# Patient Record
Sex: MALE | URBAN - METROPOLITAN AREA
[De-identification: names, ages, dates, MRNs, and addresses within clinical notes are randomized per-mention and may not be internally consistent; named-entity substitution may affect disease eponyms.]

---

## 2019-01-21 ENCOUNTER — APPOINTMENT (RX ONLY)
Dept: URBAN - METROPOLITAN AREA CLINIC 184 | Facility: CLINIC | Age: 84
Setting detail: DERMATOLOGY
End: 2019-01-21

## 2019-01-21 DIAGNOSIS — L82.1 OTHER SEBORRHEIC KERATOSIS: ICD-10-CM

## 2019-01-21 DIAGNOSIS — L57.8 OTHER SKIN CHANGES DUE TO CHRONIC EXPOSURE TO NONIONIZING RADIATION: ICD-10-CM

## 2019-01-21 DIAGNOSIS — L57.0 ACTINIC KERATOSIS: ICD-10-CM

## 2019-01-21 DIAGNOSIS — D22 MELANOCYTIC NEVI: ICD-10-CM

## 2019-01-21 DIAGNOSIS — L81.4 OTHER MELANIN HYPERPIGMENTATION: ICD-10-CM

## 2019-01-21 PROBLEM — D48.5 NEOPLASM OF UNCERTAIN BEHAVIOR OF SKIN: Status: ACTIVE | Noted: 2019-01-21

## 2019-01-21 PROBLEM — Z85.828 PERSONAL HISTORY OF OTHER MALIGNANT NEOPLASM OF SKIN: Status: ACTIVE | Noted: 2019-01-21

## 2019-01-21 PROBLEM — L70.0 ACNE VULGARIS: Status: ACTIVE | Noted: 2019-01-21

## 2019-01-21 PROBLEM — D22.9 MELANOCYTIC NEVI, UNSPECIFIED: Status: ACTIVE | Noted: 2019-01-21

## 2019-01-21 PROCEDURE — ? SUNSCREEN RECOMMENDATIONS

## 2019-01-21 PROCEDURE — ? COUNSELING

## 2019-01-21 PROCEDURE — ? LIQUID NITROGEN

## 2019-01-21 PROCEDURE — 99213 OFFICE O/P EST LOW 20 MIN: CPT | Mod: 25

## 2019-01-21 PROCEDURE — 17003 DESTRUCT PREMALG LES 2-14: CPT

## 2019-01-21 PROCEDURE — 17000 DESTRUCT PREMALG LESION: CPT

## 2019-01-21 ASSESSMENT — LOCATION SIMPLE DESCRIPTION DERM
LOCATION SIMPLE: SCALP
LOCATION SIMPLE: LEFT SCALP

## 2019-01-21 ASSESSMENT — LOCATION DETAILED DESCRIPTION DERM
LOCATION DETAILED: LEFT SUPERIOR PARIETAL SCALP
LOCATION DETAILED: LEFT CENTRAL FRONTAL SCALP

## 2019-01-21 ASSESSMENT — LOCATION ZONE DERM: LOCATION ZONE: SCALP

## 2019-01-21 NOTE — PROCEDURE: LIQUID NITROGEN
Number Of Freeze-Thaw Cycles: 1 freeze-thaw cycle
Post-Care Instructions: I reviewed with the patient in detail post-care instructions. Patient is to wear sunprotection, and avoid picking at any of the treated lesions. Pt may apply Vaseline to crusted or scabbing areas.
Detail Level: Zone
Render Post-Care Instructions In Note?: no
Duration Of Freeze Thaw-Cycle (Seconds): 10
Consent: The patient's consent was obtained including but not limited to risks of crusting, scabbing, blistering, scarring, darker or lighter pigmentary change, recurrence, incomplete removal and infection.

## 2019-07-29 ENCOUNTER — APPOINTMENT (RX ONLY)
Dept: URBAN - METROPOLITAN AREA CLINIC 184 | Facility: CLINIC | Age: 84
Setting detail: DERMATOLOGY
End: 2019-07-29

## 2019-07-29 ENCOUNTER — RX ONLY (OUTPATIENT)
Age: 84
Setting detail: RX ONLY
End: 2019-07-29

## 2019-07-29 DIAGNOSIS — D22 MELANOCYTIC NEVI: ICD-10-CM

## 2019-07-29 DIAGNOSIS — L82.1 OTHER SEBORRHEIC KERATOSIS: ICD-10-CM

## 2019-07-29 DIAGNOSIS — Z85.828 PERSONAL HISTORY OF OTHER MALIGNANT NEOPLASM OF SKIN: ICD-10-CM

## 2019-07-29 DIAGNOSIS — L72.3 SEBACEOUS CYST: ICD-10-CM

## 2019-07-29 DIAGNOSIS — L57.8 OTHER SKIN CHANGES DUE TO CHRONIC EXPOSURE TO NONIONIZING RADIATION: ICD-10-CM

## 2019-07-29 DIAGNOSIS — L57.0 ACTINIC KERATOSIS: ICD-10-CM

## 2019-07-29 DIAGNOSIS — R20.2 PARESTHESIA OF SKIN: ICD-10-CM

## 2019-07-29 DIAGNOSIS — L81.4 OTHER MELANIN HYPERPIGMENTATION: ICD-10-CM

## 2019-07-29 PROBLEM — D22.9 MELANOCYTIC NEVI, UNSPECIFIED: Status: ACTIVE | Noted: 2019-07-29

## 2019-07-29 PROCEDURE — 17003 DESTRUCT PREMALG LES 2-14: CPT

## 2019-07-29 PROCEDURE — 17000 DESTRUCT PREMALG LESION: CPT

## 2019-07-29 PROCEDURE — ? SUNSCREEN RECOMMENDATIONS

## 2019-07-29 PROCEDURE — ? INVENTORY

## 2019-07-29 PROCEDURE — ? COUNSELING

## 2019-07-29 PROCEDURE — ? LIQUID NITROGEN

## 2019-07-29 PROCEDURE — 99214 OFFICE O/P EST MOD 30 MIN: CPT | Mod: 25

## 2019-07-29 RX ORDER — KETOCONAZOLE 20.5 MG/ML
SHAMPOO, SUSPENSION TOPICAL
Qty: 1 | Refills: 3 | COMMUNITY
Start: 2019-07-29

## 2019-07-29 ASSESSMENT — LOCATION SIMPLE DESCRIPTION DERM
LOCATION SIMPLE: SCALP
LOCATION SIMPLE: LEFT FOREHEAD
LOCATION SIMPLE: LEFT PRETIBIAL REGION
LOCATION SIMPLE: RIGHT UPPER BACK

## 2019-07-29 ASSESSMENT — LOCATION DETAILED DESCRIPTION DERM
LOCATION DETAILED: LEFT SUPERIOR FOREHEAD
LOCATION DETAILED: RIGHT MID-UPPER BACK
LOCATION DETAILED: LEFT DISTAL PRETIBIAL REGION
LOCATION DETAILED: RIGHT SUPERIOR PARIETAL SCALP
LOCATION DETAILED: RIGHT MEDIAL UPPER BACK

## 2019-07-29 ASSESSMENT — LOCATION ZONE DERM
LOCATION ZONE: FACE
LOCATION ZONE: SCALP
LOCATION ZONE: LEG
LOCATION ZONE: TRUNK

## 2020-01-20 ENCOUNTER — APPOINTMENT (RX ONLY)
Dept: URBAN - METROPOLITAN AREA CLINIC 184 | Facility: CLINIC | Age: 85
Setting detail: DERMATOLOGY
End: 2020-01-20

## 2020-01-20 DIAGNOSIS — Z85.828 PERSONAL HISTORY OF OTHER MALIGNANT NEOPLASM OF SKIN: ICD-10-CM

## 2020-01-20 DIAGNOSIS — L82.1 OTHER SEBORRHEIC KERATOSIS: ICD-10-CM

## 2020-01-20 DIAGNOSIS — L57.8 OTHER SKIN CHANGES DUE TO CHRONIC EXPOSURE TO NONIONIZING RADIATION: ICD-10-CM

## 2020-01-20 DIAGNOSIS — D22 MELANOCYTIC NEVI: ICD-10-CM

## 2020-01-20 DIAGNOSIS — L81.4 OTHER MELANIN HYPERPIGMENTATION: ICD-10-CM

## 2020-01-20 DIAGNOSIS — L57.0 ACTINIC KERATOSIS: ICD-10-CM

## 2020-01-20 PROBLEM — D22.9 MELANOCYTIC NEVI, UNSPECIFIED: Status: ACTIVE | Noted: 2020-01-20

## 2020-01-20 PROCEDURE — ? SUNSCREEN RECOMMENDATIONS

## 2020-01-20 PROCEDURE — 99213 OFFICE O/P EST LOW 20 MIN: CPT | Mod: 25

## 2020-01-20 PROCEDURE — ? LIQUID NITROGEN

## 2020-01-20 PROCEDURE — 17000 DESTRUCT PREMALG LESION: CPT

## 2020-01-20 PROCEDURE — ? COUNSELING

## 2020-01-20 PROCEDURE — 17003 DESTRUCT PREMALG LES 2-14: CPT

## 2020-01-20 ASSESSMENT — LOCATION DETAILED DESCRIPTION DERM
LOCATION DETAILED: LEFT SUPERIOR PARIETAL SCALP
LOCATION DETAILED: LEFT NASAL DORSUM

## 2020-01-20 ASSESSMENT — LOCATION SIMPLE DESCRIPTION DERM
LOCATION SIMPLE: NOSE
LOCATION SIMPLE: SCALP

## 2020-01-20 ASSESSMENT — LOCATION ZONE DERM
LOCATION ZONE: SCALP
LOCATION ZONE: NOSE

## 2020-01-20 NOTE — PROCEDURE: LIQUID NITROGEN
Detail Level: Simple
Render Note In Bullet Format When Appropriate: No
Duration Of Freeze Thaw-Cycle (Seconds): 10
Post-Care Instructions: I reviewed with the patient in detail post-care instructions. Patient is to wear sunprotection, and avoid picking at any of the treated lesions. Pt may apply Vaseline to crusted or scabbing areas.
Number Of Freeze-Thaw Cycles: 1 freeze-thaw cycle
Consent: The patient's consent was obtained including but not limited to risks of crusting, scabbing, blistering, scarring, darker or lighter pigmentary change, recurrence, incomplete removal and infection.

## 2020-07-20 ENCOUNTER — APPOINTMENT (RX ONLY)
Dept: URBAN - METROPOLITAN AREA CLINIC 184 | Facility: CLINIC | Age: 85
Setting detail: DERMATOLOGY
End: 2020-07-20

## 2020-07-20 DIAGNOSIS — L72.3 SEBACEOUS CYST: ICD-10-CM

## 2020-07-20 DIAGNOSIS — L82.0 INFLAMED SEBORRHEIC KERATOSIS: ICD-10-CM

## 2020-07-20 DIAGNOSIS — L81.4 OTHER MELANIN HYPERPIGMENTATION: ICD-10-CM

## 2020-07-20 DIAGNOSIS — L82.1 OTHER SEBORRHEIC KERATOSIS: ICD-10-CM

## 2020-07-20 DIAGNOSIS — Z85.828 PERSONAL HISTORY OF OTHER MALIGNANT NEOPLASM OF SKIN: ICD-10-CM

## 2020-07-20 DIAGNOSIS — L57.8 OTHER SKIN CHANGES DUE TO CHRONIC EXPOSURE TO NONIONIZING RADIATION: ICD-10-CM

## 2020-07-20 DIAGNOSIS — D22 MELANOCYTIC NEVI: ICD-10-CM

## 2020-07-20 DIAGNOSIS — L57.0 ACTINIC KERATOSIS: ICD-10-CM

## 2020-07-20 PROBLEM — D22.9 MELANOCYTIC NEVI, UNSPECIFIED: Status: ACTIVE | Noted: 2020-07-20

## 2020-07-20 PROCEDURE — ? BENIGN DESTRUCTION

## 2020-07-20 PROCEDURE — ? SUNSCREEN RECOMMENDATIONS

## 2020-07-20 PROCEDURE — 17000 DESTRUCT PREMALG LESION: CPT | Mod: 59

## 2020-07-20 PROCEDURE — ? INCISION AND DRAINAGE

## 2020-07-20 PROCEDURE — 99214 OFFICE O/P EST MOD 30 MIN: CPT | Mod: 25

## 2020-07-20 PROCEDURE — 17003 DESTRUCT PREMALG LES 2-14: CPT | Mod: 59

## 2020-07-20 PROCEDURE — ? LIQUID NITROGEN

## 2020-07-20 PROCEDURE — ? COUNSELING

## 2020-07-20 PROCEDURE — 17110 DESTRUCTION B9 LES UP TO 14: CPT

## 2020-07-20 PROCEDURE — 10060 I&D ABSCESS SIMPLE/SINGLE: CPT

## 2020-07-20 ASSESSMENT — LOCATION DETAILED DESCRIPTION DERM
LOCATION DETAILED: POSTERIOR MID-PARIETAL SCALP
LOCATION DETAILED: MID-OCCIPITAL SCALP
LOCATION DETAILED: RIGHT SUPERIOR PARIETAL SCALP
LOCATION DETAILED: RIGHT MEDIAL FOREHEAD
LOCATION DETAILED: RIGHT SUPERIOR CENTRAL MALAR CHEEK

## 2020-07-20 ASSESSMENT — LOCATION SIMPLE DESCRIPTION DERM
LOCATION SIMPLE: POSTERIOR SCALP
LOCATION SIMPLE: RIGHT FOREHEAD
LOCATION SIMPLE: SCALP
LOCATION SIMPLE: RIGHT CHEEK

## 2020-07-20 ASSESSMENT — LOCATION ZONE DERM
LOCATION ZONE: FACE
LOCATION ZONE: SCALP

## 2020-07-20 NOTE — PROCEDURE: BENIGN DESTRUCTION
Consent: The patient's consent was obtained including but not limited to risks of crusting, scabbing, blistering, scarring, darker or lighter pigmentary change, recurrence, incomplete removal and infection.
Anesthesia Volume In Cc: 0.5
Medical Necessity Information: It is in your best interest to select a reason for this procedure from the list below. All of these items fulfill various CMS LCD requirements except the new and changing color options.
Treatment Number (Will Not Render If 0): 0
Post-Care Instructions: I reviewed with the patient in detail post-care instructions. Patient is to wear sunprotection, and avoid picking at any of the treated lesions. Pt may apply Vaseline to crusted or scabbing areas.
Detail Level: Detailed
Render Post-Care Instructions In Note?: no
Medical Necessity Clause: This procedure was medically necessary because the lesions that were treated were:

## 2020-07-20 NOTE — PROCEDURE: INCISION AND DRAINAGE
Detail Level: Detailed
Lesion Type: Cyst
Method: comedo extractor
Curette: No
Anesthesia Type: 1% lidocaine with epinephrine
Size Of Lesion In Cm (Optional But May Be Required For Some Insurances): 0
Wound Care: Polysporin ointment
Dressing: dry sterile dressing
Epidermal Sutures: 4-0 Ethilon
Epidermal Closure: simple interrupted
Suture Text: The incision was partially closed with
Preparation Text: The area was prepped in the usual clean fashion.
Curette Text (Optional): After the contents were expressed a curette was used to partially remove the cyst wall.
Consent was obtained and risks were reviewed including but not limited to delayed wound healing, infection, need for multiple I and D's, and pain.
Post-Care Instructions: I reviewed with the patient in detail post-care instructions. Patient should keep wound covered and call the office should any redness, pain, swelling or worsening occur.

## 2020-07-20 NOTE — PROCEDURE: LIQUID NITROGEN
Number Of Freeze-Thaw Cycles: 1 freeze-thaw cycle
Post-Care Instructions: I reviewed with the patient in detail post-care instructions. Patient is to wear sunprotection, and avoid picking at any of the treated lesions. Pt may apply Vaseline to crusted or scabbing areas.
Detail Level: Simple
Render Note In Bullet Format When Appropriate: No
Duration Of Freeze Thaw-Cycle (Seconds): 10
Consent: The patient's consent was obtained including but not limited to risks of crusting, scabbing, blistering, scarring, darker or lighter pigmentary change, recurrence, incomplete removal and infection.

## 2021-01-25 ENCOUNTER — APPOINTMENT (RX ONLY)
Dept: URBAN - METROPOLITAN AREA CLINIC 184 | Facility: CLINIC | Age: 86
Setting detail: DERMATOLOGY
End: 2021-01-25

## 2021-01-25 DIAGNOSIS — L57.0 ACTINIC KERATOSIS: ICD-10-CM

## 2021-01-25 PROCEDURE — 17003 DESTRUCT PREMALG LES 2-14: CPT

## 2021-01-25 PROCEDURE — ? LIQUID NITROGEN

## 2021-01-25 PROCEDURE — 17000 DESTRUCT PREMALG LESION: CPT

## 2021-01-25 ASSESSMENT — LOCATION DETAILED DESCRIPTION DERM
LOCATION DETAILED: LEFT CENTRAL FRONTAL SCALP
LOCATION DETAILED: MID-OCCIPITAL SCALP
LOCATION DETAILED: RIGHT SUPERIOR PARIETAL SCALP

## 2021-01-25 ASSESSMENT — LOCATION SIMPLE DESCRIPTION DERM
LOCATION SIMPLE: SCALP
LOCATION SIMPLE: LEFT SCALP
LOCATION SIMPLE: POSTERIOR SCALP

## 2021-01-25 ASSESSMENT — LOCATION ZONE DERM: LOCATION ZONE: SCALP

## 2021-01-25 NOTE — PROCEDURE: LIQUID NITROGEN
Number Of Freeze-Thaw Cycles: 1 freeze-thaw cycle
Duration Of Freeze Thaw-Cycle (Seconds): 10
Render Note In Bullet Format When Appropriate: No
Detail Level: Zone
Post-Care Instructions: I reviewed with the patient in detail post-care instructions. Patient is to wear sunprotection, and avoid picking at any of the treated lesions. Pt may apply Vaseline to crusted or scabbing areas.
Consent: The patient's consent was obtained including but not limited to risks of crusting, scabbing, blistering, scarring, darker or lighter pigmentary change, recurrence, incomplete removal and infection.

## 2021-05-03 ENCOUNTER — APPOINTMENT (RX ONLY)
Dept: URBAN - METROPOLITAN AREA CLINIC 184 | Facility: CLINIC | Age: 86
Setting detail: DERMATOLOGY
End: 2021-05-03

## 2021-05-03 DIAGNOSIS — L82.1 OTHER SEBORRHEIC KERATOSIS: ICD-10-CM | Status: STABLE

## 2021-05-03 DIAGNOSIS — L57.0 ACTINIC KERATOSIS: ICD-10-CM | Status: INADEQUATELY CONTROLLED

## 2021-05-03 PROCEDURE — 17003 DESTRUCT PREMALG LES 2-14: CPT

## 2021-05-03 PROCEDURE — 99212 OFFICE O/P EST SF 10 MIN: CPT | Mod: 25

## 2021-05-03 PROCEDURE — ? COUNSELING

## 2021-05-03 PROCEDURE — 17000 DESTRUCT PREMALG LESION: CPT

## 2021-05-03 PROCEDURE — ? LIQUID NITROGEN

## 2021-05-03 ASSESSMENT — LOCATION DETAILED DESCRIPTION DERM
LOCATION DETAILED: RIGHT SUPERIOR FOREHEAD
LOCATION DETAILED: LEFT SUPERIOR PARIETAL SCALP
LOCATION DETAILED: RIGHT ANTERIOR PROXIMAL THIGH
LOCATION DETAILED: LEFT SUPERIOR OCCIPITAL SCALP
LOCATION DETAILED: RIGHT SUPERIOR OCCIPITAL SCALP

## 2021-05-03 ASSESSMENT — LOCATION SIMPLE DESCRIPTION DERM
LOCATION SIMPLE: LEFT OCCIPITAL SCALP
LOCATION SIMPLE: SCALP
LOCATION SIMPLE: RIGHT OCCIPITAL SCALP
LOCATION SIMPLE: RIGHT THIGH
LOCATION SIMPLE: RIGHT FOREHEAD

## 2021-05-03 ASSESSMENT — LOCATION ZONE DERM
LOCATION ZONE: SCALP
LOCATION ZONE: FACE
LOCATION ZONE: LEG

## 2021-05-03 NOTE — PROCEDURE: COUNSELING
Detail Level: Simple
Detail Level: Detailed
Patient Specific Counseling (Will Not Stick From Patient To Patient): Wear hat when out

## 2021-07-27 ENCOUNTER — APPOINTMENT (RX ONLY)
Dept: URBAN - METROPOLITAN AREA CLINIC 184 | Facility: CLINIC | Age: 86
Setting detail: DERMATOLOGY
End: 2021-07-27

## 2021-07-27 DIAGNOSIS — L57.8 OTHER SKIN CHANGES DUE TO CHRONIC EXPOSURE TO NONIONIZING RADIATION: ICD-10-CM

## 2021-07-27 DIAGNOSIS — L81.4 OTHER MELANIN HYPERPIGMENTATION: ICD-10-CM

## 2021-07-27 DIAGNOSIS — L82.1 OTHER SEBORRHEIC KERATOSIS: ICD-10-CM

## 2021-07-27 DIAGNOSIS — L57.0 ACTINIC KERATOSIS: ICD-10-CM

## 2021-07-27 DIAGNOSIS — D22 MELANOCYTIC NEVI: ICD-10-CM

## 2021-07-27 DIAGNOSIS — Z85.828 PERSONAL HISTORY OF OTHER MALIGNANT NEOPLASM OF SKIN: ICD-10-CM

## 2021-07-27 PROBLEM — D22.9 MELANOCYTIC NEVI, UNSPECIFIED: Status: ACTIVE | Noted: 2021-07-27

## 2021-07-27 PROCEDURE — 99213 OFFICE O/P EST LOW 20 MIN: CPT | Mod: 25

## 2021-07-27 PROCEDURE — ? TREATMENT REGIMEN

## 2021-07-27 PROCEDURE — ? LIQUID NITROGEN

## 2021-07-27 PROCEDURE — ? COUNSELING

## 2021-07-27 PROCEDURE — 17003 DESTRUCT PREMALG LES 2-14: CPT

## 2021-07-27 PROCEDURE — 17000 DESTRUCT PREMALG LESION: CPT

## 2021-07-27 PROCEDURE — ? FULL BODY SKIN EXAM

## 2021-07-27 PROCEDURE — ? SUNSCREEN RECOMMENDATIONS

## 2021-07-27 ASSESSMENT — LOCATION SIMPLE DESCRIPTION DERM
LOCATION SIMPLE: LEFT FOREHEAD
LOCATION SIMPLE: SCALP

## 2021-07-27 ASSESSMENT — LOCATION ZONE DERM
LOCATION ZONE: FACE
LOCATION ZONE: SCALP

## 2021-07-27 ASSESSMENT — LOCATION DETAILED DESCRIPTION DERM
LOCATION DETAILED: LEFT SUPERIOR PARIETAL SCALP
LOCATION DETAILED: LEFT SUPERIOR LATERAL FOREHEAD

## 2021-07-27 NOTE — PROCEDURE: LIQUID NITROGEN
Render Note In Bullet Format When Appropriate: No
Number Of Freeze-Thaw Cycles: 1 freeze-thaw cycle
Duration Of Freeze Thaw-Cycle (Seconds): 10
Post-Care Instructions: I reviewed with the patient in detail post-care instructions. Patient is to wear sunprotection, and avoid picking at any of the treated lesions. Pt may apply Vaseline to crusted or scabbing areas.
Consent: The patient's consent was obtained including but not limited to risks of crusting, scabbing, blistering, scarring, darker or lighter pigmentary change, recurrence, incomplete removal and infection.
Show Aperture Variable?: Yes
Detail Level: Zone

## 2022-06-07 ENCOUNTER — APPOINTMENT (RX ONLY)
Dept: URBAN - METROPOLITAN AREA CLINIC 184 | Facility: CLINIC | Age: 87
Setting detail: DERMATOLOGY
End: 2022-06-07

## 2022-06-07 DIAGNOSIS — L81.4 OTHER MELANIN HYPERPIGMENTATION: ICD-10-CM

## 2022-06-07 DIAGNOSIS — D22 MELANOCYTIC NEVI: ICD-10-CM

## 2022-06-07 DIAGNOSIS — L57.0 ACTINIC KERATOSIS: ICD-10-CM

## 2022-06-07 DIAGNOSIS — L57.8 OTHER SKIN CHANGES DUE TO CHRONIC EXPOSURE TO NONIONIZING RADIATION: ICD-10-CM

## 2022-06-07 DIAGNOSIS — L82.1 OTHER SEBORRHEIC KERATOSIS: ICD-10-CM

## 2022-06-07 DIAGNOSIS — Z85.828 PERSONAL HISTORY OF OTHER MALIGNANT NEOPLASM OF SKIN: ICD-10-CM

## 2022-06-07 PROBLEM — D22.9 MELANOCYTIC NEVI, UNSPECIFIED: Status: ACTIVE | Noted: 2022-06-07

## 2022-06-07 PROCEDURE — ? TREATMENT REGIMEN

## 2022-06-07 PROCEDURE — 99213 OFFICE O/P EST LOW 20 MIN: CPT | Mod: 25

## 2022-06-07 PROCEDURE — ? COUNSELING

## 2022-06-07 PROCEDURE — ? LIQUID NITROGEN

## 2022-06-07 PROCEDURE — 17003 DESTRUCT PREMALG LES 2-14: CPT

## 2022-06-07 PROCEDURE — 17000 DESTRUCT PREMALG LESION: CPT

## 2022-06-07 PROCEDURE — ? SUNSCREEN RECOMMENDATIONS

## 2022-06-07 ASSESSMENT — LOCATION ZONE DERM
LOCATION ZONE: LEG
LOCATION ZONE: FACE
LOCATION ZONE: SCALP

## 2022-06-07 ASSESSMENT — LOCATION SIMPLE DESCRIPTION DERM
LOCATION SIMPLE: POSTERIOR SCALP
LOCATION SIMPLE: LEFT FOREHEAD
LOCATION SIMPLE: LEFT PRETIBIAL REGION

## 2022-06-07 ASSESSMENT — LOCATION DETAILED DESCRIPTION DERM
LOCATION DETAILED: LEFT DISTAL PRETIBIAL REGION
LOCATION DETAILED: LEFT SUPERIOR FOREHEAD
LOCATION DETAILED: POSTERIOR MID-PARIETAL SCALP

## 2022-06-07 NOTE — PROCEDURE: LIQUID NITROGEN
Render Note In Bullet Format When Appropriate: No
Duration Of Freeze Thaw-Cycle (Seconds): 10
Post-Care Instructions: I reviewed with the patient in detail post-care instructions. Patient is to wear sunprotection, and avoid picking at any of the treated lesions. Pt may apply Vaseline to crusted or scabbing areas.
Number Of Freeze-Thaw Cycles: 1 freeze-thaw cycle
Detail Level: Zone
Show Aperture Variable?: Yes
Consent: The patient's consent was obtained including but not limited to risks of crusting, scabbing, blistering, scarring, darker or lighter pigmentary change, recurrence, incomplete removal and infection.

## 2022-06-27 ENCOUNTER — APPOINTMENT (RX ONLY)
Dept: URBAN - METROPOLITAN AREA CLINIC 184 | Facility: CLINIC | Age: 87
Setting detail: DERMATOLOGY
End: 2022-06-27

## 2022-06-27 DIAGNOSIS — L82.0 INFLAMED SEBORRHEIC KERATOSIS: ICD-10-CM

## 2022-06-27 PROCEDURE — ? LIQUID NITROGEN

## 2022-06-27 PROCEDURE — 17110 DESTRUCTION B9 LES UP TO 14: CPT

## 2022-06-27 ASSESSMENT — LOCATION ZONE DERM: LOCATION ZONE: LEG

## 2022-06-27 ASSESSMENT — LOCATION SIMPLE DESCRIPTION DERM: LOCATION SIMPLE: LEFT THIGH

## 2022-06-27 ASSESSMENT — LOCATION DETAILED DESCRIPTION DERM: LOCATION DETAILED: LEFT ANTERIOR LATERAL DISTAL THIGH

## 2022-06-27 NOTE — PROCEDURE: LIQUID NITROGEN
Render Post-Care Instructions In Note?: no
Show Applicator Variable?: Yes
Detail Level: Simple
Medical Necessity Information: It is in your best interest to select a reason for this procedure from the list below. All of these items fulfill various CMS LCD requirements except the new and changing color options.
Consent: The patient's consent was obtained including but not limited to risks of crusting, scabbing, blistering, scarring, darker or lighter pigmentary change, recurrence, incomplete removal and infection.
Spray Paint Text: The liquid nitrogen was applied to the skin utilizing a spray paint frosting technique.
Medical Necessity Clause: This procedure was medically necessary because the lesions that were treated were:
Post-Care Instructions: I reviewed with the patient in detail post-care instructions. Patient is to wear sunprotection, and avoid picking at any of the treated lesions. Pt may apply Vaseline to crusted or scabbing areas.

## 2022-12-21 ENCOUNTER — APPOINTMENT (RX ONLY)
Dept: URBAN - METROPOLITAN AREA CLINIC 184 | Facility: CLINIC | Age: 87
Setting detail: DERMATOLOGY
End: 2022-12-21

## 2022-12-21 DIAGNOSIS — L82.1 OTHER SEBORRHEIC KERATOSIS: ICD-10-CM

## 2022-12-21 DIAGNOSIS — L57.8 OTHER SKIN CHANGES DUE TO CHRONIC EXPOSURE TO NONIONIZING RADIATION: ICD-10-CM

## 2022-12-21 DIAGNOSIS — L57.0 ACTINIC KERATOSIS: ICD-10-CM

## 2022-12-21 DIAGNOSIS — L81.4 OTHER MELANIN HYPERPIGMENTATION: ICD-10-CM

## 2022-12-21 DIAGNOSIS — Z85.828 PERSONAL HISTORY OF OTHER MALIGNANT NEOPLASM OF SKIN: ICD-10-CM

## 2022-12-21 DIAGNOSIS — D22 MELANOCYTIC NEVI: ICD-10-CM

## 2022-12-21 PROBLEM — D22.9 MELANOCYTIC NEVI, UNSPECIFIED: Status: ACTIVE | Noted: 2022-12-21

## 2022-12-21 PROCEDURE — ? SUNSCREEN RECOMMENDATIONS

## 2022-12-21 PROCEDURE — ? LIQUID NITROGEN

## 2022-12-21 PROCEDURE — ? COUNSELING

## 2022-12-21 PROCEDURE — ? TREATMENT REGIMEN

## 2022-12-21 PROCEDURE — 17003 DESTRUCT PREMALG LES 2-14: CPT

## 2022-12-21 PROCEDURE — 99213 OFFICE O/P EST LOW 20 MIN: CPT | Mod: 25

## 2022-12-21 PROCEDURE — 17000 DESTRUCT PREMALG LESION: CPT

## 2022-12-21 ASSESSMENT — LOCATION DETAILED DESCRIPTION DERM
LOCATION DETAILED: MID-OCCIPITAL SCALP
LOCATION DETAILED: LEFT DISTAL PRETIBIAL REGION
LOCATION DETAILED: LEFT CENTRAL FRONTAL SCALP
LOCATION DETAILED: LEFT DISTAL POSTERIOR UPPER ARM

## 2022-12-21 ASSESSMENT — LOCATION ZONE DERM
LOCATION ZONE: ARM
LOCATION ZONE: SCALP
LOCATION ZONE: LEG

## 2022-12-21 ASSESSMENT — LOCATION SIMPLE DESCRIPTION DERM
LOCATION SIMPLE: POSTERIOR SCALP
LOCATION SIMPLE: LEFT UPPER ARM
LOCATION SIMPLE: LEFT PRETIBIAL REGION
LOCATION SIMPLE: LEFT SCALP

## 2022-12-21 NOTE — PROCEDURE: LIQUID NITROGEN
Render Post-Care Instructions In Note?: no
Number Of Freeze-Thaw Cycles: 1 freeze-thaw cycle
Consent: The patient's consent was obtained including but not limited to risks of crusting, scabbing, blistering, scarring, darker or lighter pigmentary change, recurrence, incomplete removal and infection.
Detail Level: Zone
Show Aperture Variable?: Yes
Post-Care Instructions: I reviewed with the patient in detail post-care instructions. Patient is to wear sunprotection, and avoid picking at any of the treated lesions. Pt may apply Vaseline to crusted or scabbing areas.
Duration Of Freeze Thaw-Cycle (Seconds): 10

## 2023-06-21 ENCOUNTER — APPOINTMENT (RX ONLY)
Dept: URBAN - METROPOLITAN AREA CLINIC 184 | Facility: CLINIC | Age: 88
Setting detail: DERMATOLOGY
End: 2023-06-21

## 2023-06-21 DIAGNOSIS — L57.0 ACTINIC KERATOSIS: ICD-10-CM

## 2023-06-21 PROCEDURE — ? COUNSELING

## 2023-06-21 PROCEDURE — 17000 DESTRUCT PREMALG LESION: CPT

## 2023-06-21 PROCEDURE — ? LIQUID NITROGEN

## 2023-06-21 PROCEDURE — 17003 DESTRUCT PREMALG LES 2-14: CPT

## 2023-06-21 ASSESSMENT — LOCATION ZONE DERM
LOCATION ZONE: SCALP
LOCATION ZONE: LEG

## 2023-06-21 ASSESSMENT — LOCATION SIMPLE DESCRIPTION DERM
LOCATION SIMPLE: RIGHT PRETIBIAL REGION
LOCATION SIMPLE: SCALP

## 2023-06-21 ASSESSMENT — LOCATION DETAILED DESCRIPTION DERM
LOCATION DETAILED: RIGHT SUPERIOR PARIETAL SCALP
LOCATION DETAILED: RIGHT PROXIMAL PRETIBIAL REGION

## 2023-06-21 NOTE — PROCEDURE: LIQUID NITROGEN
Render Post-Care Instructions In Note?: no
Duration Of Freeze Thaw-Cycle (Seconds): 10
Show Aperture Variable?: Yes
Detail Level: Zone
Application Tool (Optional): Liquid Nitrogen Sprayer
Number Of Freeze-Thaw Cycles: 1 freeze-thaw cycle
Consent: The patient's consent was obtained including but not limited to risks of crusting, scabbing, blistering, scarring, darker or lighter pigmentary change, recurrence, incomplete removal and infection.
Post-Care Instructions: I reviewed with the patient in detail post-care instructions. Patient is to wear sunprotection, and avoid picking at any of the treated lesions. Pt may apply Vaseline to crusted or scabbing areas.

## 2023-07-17 ENCOUNTER — APPOINTMENT (RX ONLY)
Dept: URBAN - METROPOLITAN AREA CLINIC 184 | Facility: CLINIC | Age: 88
Setting detail: DERMATOLOGY
End: 2023-07-17

## 2023-07-17 DIAGNOSIS — L82.1 OTHER SEBORRHEIC KERATOSIS: ICD-10-CM

## 2023-07-17 DIAGNOSIS — L21.8 OTHER SEBORRHEIC DERMATITIS: ICD-10-CM

## 2023-07-17 DIAGNOSIS — L57.0 ACTINIC KERATOSIS: ICD-10-CM

## 2023-07-17 PROCEDURE — 99213 OFFICE O/P EST LOW 20 MIN: CPT | Mod: 25

## 2023-07-17 PROCEDURE — ? PRESCRIPTION

## 2023-07-17 PROCEDURE — ? LIQUID NITROGEN

## 2023-07-17 PROCEDURE — ? COUNSELING

## 2023-07-17 PROCEDURE — 17000 DESTRUCT PREMALG LESION: CPT

## 2023-07-17 PROCEDURE — 17003 DESTRUCT PREMALG LES 2-14: CPT

## 2023-07-17 RX ORDER — KETOCONAZOLE 20 MG/ML
SHAMPOO, SUSPENSION TOPICAL TIW
Qty: 120 | Refills: 5 | Status: ERX | COMMUNITY
Start: 2023-07-17

## 2023-07-17 RX ADMIN — KETOCONAZOLE: 20 SHAMPOO, SUSPENSION TOPICAL at 00:00

## 2023-07-17 ASSESSMENT — LOCATION DETAILED DESCRIPTION DERM
LOCATION DETAILED: LEFT SUPERIOR FOREHEAD
LOCATION DETAILED: RIGHT MEDIAL FRONTAL SCALP
LOCATION DETAILED: RIGHT SUPERIOR PARIETAL SCALP

## 2023-07-17 ASSESSMENT — LOCATION SIMPLE DESCRIPTION DERM
LOCATION SIMPLE: SCALP
LOCATION SIMPLE: LEFT FOREHEAD
LOCATION SIMPLE: RIGHT SCALP

## 2023-07-17 ASSESSMENT — LOCATION ZONE DERM
LOCATION ZONE: SCALP
LOCATION ZONE: FACE

## 2023-09-29 NOTE — HPI: EVALUATION OF SKIN LESION(S)
Hpi Title: Evaluation of Skin Lesions
advise  
How Severe Are Your Spot(S)?: mild
Have Your Spot(S) Been Treated In The Past?: has not been treated

## 2023-11-28 ENCOUNTER — APPOINTMENT (RX ONLY)
Dept: URBAN - METROPOLITAN AREA CLINIC 184 | Facility: CLINIC | Age: 88
Setting detail: DERMATOLOGY
End: 2023-11-28

## 2023-11-28 DIAGNOSIS — L57.0 ACTINIC KERATOSIS: ICD-10-CM

## 2023-11-28 PROCEDURE — ? LIQUID NITROGEN

## 2023-11-28 PROCEDURE — 17000 DESTRUCT PREMALG LESION: CPT

## 2023-11-28 PROCEDURE — ? COUNSELING

## 2023-11-28 PROCEDURE — 17003 DESTRUCT PREMALG LES 2-14: CPT

## 2023-11-28 ASSESSMENT — LOCATION ZONE DERM
LOCATION ZONE: SCALP
LOCATION ZONE: FACE

## 2023-11-28 ASSESSMENT — LOCATION DETAILED DESCRIPTION DERM
LOCATION DETAILED: LEFT SUPERIOR PARIETAL SCALP
LOCATION DETAILED: LEFT SUPERIOR MEDIAL FOREHEAD
LOCATION DETAILED: LEFT CENTRAL MALAR CHEEK
LOCATION DETAILED: RIGHT SUPERIOR PARIETAL SCALP
LOCATION DETAILED: RIGHT SUPERIOR FOREHEAD

## 2023-11-28 ASSESSMENT — LOCATION SIMPLE DESCRIPTION DERM
LOCATION SIMPLE: LEFT CHEEK
LOCATION SIMPLE: SCALP
LOCATION SIMPLE: RIGHT FOREHEAD
LOCATION SIMPLE: LEFT FOREHEAD

## 2023-11-28 NOTE — PROCEDURE: LIQUID NITROGEN
Detail Level: Zone
Render Post-Care Instructions In Note?: no
Duration Of Freeze Thaw-Cycle (Seconds): 10
Post-Care Instructions: I reviewed with the patient in detail post-care instructions. Patient is to wear sunprotection, and avoid picking at any of the treated lesions. Pt may apply Vaseline to crusted or scabbing areas.
Number Of Freeze-Thaw Cycles: 1 freeze-thaw cycle
Consent: The patient's consent was obtained including but not limited to risks of crusting, scabbing, blistering, scarring, darker or lighter pigmentary change, recurrence, incomplete removal and infection.
Application Tool (Optional): Liquid Nitrogen Sprayer
Show Applicator Variable?: Yes

## 2024-01-24 ENCOUNTER — APPOINTMENT (RX ONLY)
Dept: URBAN - METROPOLITAN AREA CLINIC 184 | Facility: CLINIC | Age: 89
Setting detail: DERMATOLOGY
End: 2024-01-24

## 2024-01-24 DIAGNOSIS — L57.0 ACTINIC KERATOSIS: ICD-10-CM | Status: INADEQUATELY CONTROLLED

## 2024-01-24 PROCEDURE — 17003 DESTRUCT PREMALG LES 2-14: CPT

## 2024-01-24 PROCEDURE — ? LIQUID NITROGEN

## 2024-01-24 PROCEDURE — 17000 DESTRUCT PREMALG LESION: CPT

## 2024-01-24 PROCEDURE — ? COUNSELING

## 2024-01-24 ASSESSMENT — LOCATION DETAILED DESCRIPTION DERM
LOCATION DETAILED: LEFT SUPERIOR HELIX
LOCATION DETAILED: NASAL DORSUM
LOCATION DETAILED: POSTERIOR MID-PARIETAL SCALP
LOCATION DETAILED: LEFT SUPERIOR HELIX
LOCATION DETAILED: POSTERIOR MID-PARIETAL SCALP
LOCATION DETAILED: LEFT NASAL SIDEWALL

## 2024-01-24 ASSESSMENT — LOCATION ZONE DERM
LOCATION ZONE: SCALP
LOCATION ZONE: EAR
LOCATION ZONE: NOSE
LOCATION ZONE: SCALP
LOCATION ZONE: EAR

## 2024-01-24 ASSESSMENT — LOCATION SIMPLE DESCRIPTION DERM
LOCATION SIMPLE: NOSE
LOCATION SIMPLE: LEFT EAR
LOCATION SIMPLE: LEFT NOSE
LOCATION SIMPLE: LEFT EAR
LOCATION SIMPLE: POSTERIOR SCALP
LOCATION SIMPLE: POSTERIOR SCALP

## 2024-01-24 NOTE — PROCEDURE: LIQUID NITROGEN
Detail Level: Zone
Consent: The patient's consent was obtained including but not limited to risks of crusting, scabbing, blistering, scarring, darker or lighter pigmentary change, recurrence, incomplete removal and infection.
Duration Of Freeze Thaw-Cycle (Seconds): 10
Post-Care Instructions: I reviewed with the patient in detail post-care instructions. Patient is to wear sunprotection, and avoid picking at any of the treated lesions. Pt may apply Vaseline to crusted or scabbing areas.
Number Of Freeze-Thaw Cycles: 1 freeze-thaw cycle
Render Note In Bullet Format When Appropriate: No
Show Aperture Variable?: Yes

## 2024-01-24 NOTE — HPI: SKIN LESIONS
How Severe Is Your Skin Lesion?: mild
Is This A New Presentation, Or A Follow-Up?: Skin Lesions
Additional History: Patient refusing full body exam at todays visit. He requests that face and scalp only be evaluated and states that he “will schedule full body exam when he returns home from his vacation”.

## 2024-05-22 ENCOUNTER — APPOINTMENT (RX ONLY)
Dept: URBAN - METROPOLITAN AREA CLINIC 184 | Facility: CLINIC | Age: 89
Setting detail: DERMATOLOGY
End: 2024-05-22

## 2024-05-22 DIAGNOSIS — L57.8 OTHER SKIN CHANGES DUE TO CHRONIC EXPOSURE TO NONIONIZING RADIATION: ICD-10-CM

## 2024-05-22 DIAGNOSIS — D22 MELANOCYTIC NEVI: ICD-10-CM

## 2024-05-22 DIAGNOSIS — L81.4 OTHER MELANIN HYPERPIGMENTATION: ICD-10-CM

## 2024-05-22 DIAGNOSIS — L72.8 OTHER FOLLICULAR CYSTS OF THE SKIN AND SUBCUTANEOUS TISSUE: ICD-10-CM

## 2024-05-22 DIAGNOSIS — Z85.828 PERSONAL HISTORY OF OTHER MALIGNANT NEOPLASM OF SKIN: ICD-10-CM

## 2024-05-22 DIAGNOSIS — L57.0 ACTINIC KERATOSIS: ICD-10-CM

## 2024-05-22 DIAGNOSIS — L82.1 OTHER SEBORRHEIC KERATOSIS: ICD-10-CM

## 2024-05-22 PROBLEM — D22.9 MELANOCYTIC NEVI, UNSPECIFIED: Status: ACTIVE | Noted: 2024-05-22

## 2024-05-22 PROCEDURE — ? TREATMENT REGIMEN

## 2024-05-22 PROCEDURE — 99213 OFFICE O/P EST LOW 20 MIN: CPT | Mod: 25

## 2024-05-22 PROCEDURE — ? COUNSELING

## 2024-05-22 PROCEDURE — 17000 DESTRUCT PREMALG LESION: CPT

## 2024-05-22 PROCEDURE — 17003 DESTRUCT PREMALG LES 2-14: CPT

## 2024-05-22 PROCEDURE — ? SUNSCREEN RECOMMENDATIONS

## 2024-05-22 PROCEDURE — ? LIQUID NITROGEN

## 2024-05-22 ASSESSMENT — LOCATION ZONE DERM
LOCATION ZONE: TRUNK
LOCATION ZONE: SCALP
LOCATION ZONE: ARM
LOCATION ZONE: TRUNK
LOCATION ZONE: LEG

## 2024-05-22 ASSESSMENT — LOCATION DETAILED DESCRIPTION DERM
LOCATION DETAILED: RIGHT SUPERIOR OCCIPITAL SCALP
LOCATION DETAILED: INFERIOR THORACIC SPINE
LOCATION DETAILED: LEFT DISTAL PRETIBIAL REGION
LOCATION DETAILED: LEFT DISTAL POSTERIOR UPPER ARM
LOCATION DETAILED: RIGHT SUPERIOR PARIETAL SCALP
LOCATION DETAILED: MID-OCCIPITAL SCALP
LOCATION DETAILED: LEFT SUPERIOR PARIETAL SCALP
LOCATION DETAILED: LEFT MEDIAL UPPER BACK

## 2024-05-22 ASSESSMENT — LOCATION SIMPLE DESCRIPTION DERM
LOCATION SIMPLE: LEFT UPPER BACK
LOCATION SIMPLE: LEFT UPPER ARM
LOCATION SIMPLE: LEFT PRETIBIAL REGION
LOCATION SIMPLE: POSTERIOR SCALP
LOCATION SIMPLE: RIGHT OCCIPITAL SCALP
LOCATION SIMPLE: SCALP
LOCATION SIMPLE: UPPER BACK

## 2024-05-22 NOTE — PROCEDURE: SUNSCREEN RECOMMENDATIONS
asymptomatic covid testing
General Sunscreen Counseling: I recommended a broad spectrum sunscreen with a SPF of 30 or higher.  I explained that SPF 30 sunscreens block approximately 97 percent of the sun's harmful rays.  Sunscreens should be applied at least 15 minutes prior to expected sun exposure and then every 2 hours after that as long as sun exposure continues. If swimming or exercising sunscreen should be reapplied every 45 minutes to an hour after getting wet or sweating.  One ounce, or the equivalent of a shot glass full of sunscreen, is adequate to protect the skin not covered by a bathing suit. I also recommended a lip balm with a sunscreen as well. Sun protective clothing can be used in lieu of sunscreen but must be worn the entire time you are exposed to the sun's rays.
Detail Level: Detailed

## 2024-05-22 NOTE — PROCEDURE: LIQUID NITROGEN
Duration Of Freeze Thaw-Cycle (Seconds): 10
Consent: The patient's consent was obtained including but not limited to risks of crusting, scabbing, blistering, scarring, darker or lighter pigmentary change, recurrence, incomplete removal and infection.
Post-Care Instructions: I reviewed with the patient in detail post-care instructions. Patient is to wear sunprotection, and avoid picking at any of the treated lesions. Pt may apply Vaseline to crusted or scabbing areas.
Render Post-Care Instructions In Note?: no
Show Aperture Variable?: Yes
Number Of Freeze-Thaw Cycles: 1 freeze-thaw cycle
Detail Level: Detailed

## 2024-06-17 ENCOUNTER — APPOINTMENT (RX ONLY)
Dept: URBAN - METROPOLITAN AREA CLINIC 184 | Facility: CLINIC | Age: 89
Setting detail: DERMATOLOGY
End: 2024-06-17

## 2024-06-17 DIAGNOSIS — B07.8 OTHER VIRAL WARTS: ICD-10-CM

## 2024-06-17 DIAGNOSIS — L57.0 ACTINIC KERATOSIS: ICD-10-CM

## 2024-06-17 PROCEDURE — 17000 DESTRUCT PREMALG LESION: CPT | Mod: 59

## 2024-06-17 PROCEDURE — ? LIQUID NITROGEN

## 2024-06-17 PROCEDURE — ? COUNSELING

## 2024-06-17 PROCEDURE — 17110 DESTRUCTION B9 LES UP TO 14: CPT

## 2024-06-17 ASSESSMENT — LOCATION SIMPLE DESCRIPTION DERM
LOCATION SIMPLE: RIGHT FOREHEAD
LOCATION SIMPLE: LEFT CHEEK

## 2024-06-17 ASSESSMENT — LOCATION ZONE DERM: LOCATION ZONE: FACE

## 2024-06-17 ASSESSMENT — LOCATION DETAILED DESCRIPTION DERM
LOCATION DETAILED: RIGHT SUPERIOR MEDIAL FOREHEAD
LOCATION DETAILED: LEFT SUPERIOR MEDIAL BUCCAL CHEEK

## 2024-06-17 NOTE — PROCEDURE: LIQUID NITROGEN
Duration Of Freeze Thaw-Cycle (Seconds): 10
Post-Care Instructions: I reviewed with the patient in detail post-care instructions. Patient is to wear sunprotection, and avoid picking at any of the treated lesions. Pt may apply Vaseline to crusted or scabbing areas.
Render Note In Bullet Format When Appropriate: No
Show Applicator Variable?: Yes
Consent: The patient's consent was obtained including but not limited to risks of crusting, scabbing, blistering, scarring, darker or lighter pigmentary change, recurrence, incomplete removal and infection.
Detail Level: Zone
Number Of Freeze-Thaw Cycles: 1 freeze-thaw cycle
Medical Necessity Information: It is in your best interest to select a reason for this procedure from the list below. All of these items fulfill various CMS LCD requirements except the new and changing color options.
Medical Necessity Clause: This procedure was medically necessary because the lesions that were treated were:
Detail Level: Detailed
Spray Paint Text: The liquid nitrogen was applied to the skin utilizing a spray paint frosting technique.

## 2024-11-12 ENCOUNTER — APPOINTMENT (RX ONLY)
Age: 89
Setting detail: DERMATOLOGY
End: 2024-11-12

## 2024-11-12 DIAGNOSIS — L57.0 ACTINIC KERATOSIS: ICD-10-CM

## 2024-11-12 PROCEDURE — ? LIQUID NITROGEN

## 2024-11-12 PROCEDURE — 17003 DESTRUCT PREMALG LES 2-14: CPT

## 2024-11-12 PROCEDURE — ? COUNSELING

## 2024-11-12 PROCEDURE — 17000 DESTRUCT PREMALG LESION: CPT

## 2024-11-12 ASSESSMENT — LOCATION DETAILED DESCRIPTION DERM
LOCATION DETAILED: RIGHT SUPERIOR OCCIPITAL SCALP
LOCATION DETAILED: LEFT CENTRAL FRONTAL SCALP
LOCATION DETAILED: LEFT CENTRAL MALAR CHEEK
LOCATION DETAILED: POSTERIOR MID-PARIETAL SCALP
LOCATION DETAILED: LEFT SUPERIOR OCCIPITAL SCALP

## 2024-11-12 ASSESSMENT — LOCATION SIMPLE DESCRIPTION DERM
LOCATION SIMPLE: LEFT CHEEK
LOCATION SIMPLE: LEFT SCALP
LOCATION SIMPLE: POSTERIOR SCALP
LOCATION SIMPLE: SCALP

## 2024-11-12 ASSESSMENT — LOCATION ZONE DERM
LOCATION ZONE: FACE
LOCATION ZONE: SCALP

## 2024-11-12 NOTE — PROCEDURE: LIQUID NITROGEN
Detail Level: Detailed
Render Post-Care Instructions In Note?: no
Show Aperture Variable?: Yes
Consent: The patient's consent was obtained including but not limited to risks of crusting, scabbing, blistering, scarring, darker or lighter pigmentary change, recurrence, incomplete removal and infection.
Application Tool (Optional): Cry-AC
Number Of Freeze-Thaw Cycles: 1 freeze-thaw cycle
Duration Of Freeze Thaw-Cycle (Seconds): 10
Post-Care Instructions: I reviewed with the patient in detail post-care instructions. Patient is to wear sunprotection, and avoid picking at any of the treated lesions. Pt may apply Vaseline to crusted or scabbing areas.

## 2025-04-16 ENCOUNTER — APPOINTMENT (OUTPATIENT)
Age: 89
Setting detail: DERMATOLOGY
End: 2025-04-16

## 2025-04-16 DIAGNOSIS — L57.0 ACTINIC KERATOSIS: ICD-10-CM

## 2025-04-16 DIAGNOSIS — D485 NEOPLASM OF UNCERTAIN BEHAVIOR OF SKIN: ICD-10-CM

## 2025-04-16 PROBLEM — D48.5 NEOPLASM OF UNCERTAIN BEHAVIOR OF SKIN: Status: ACTIVE | Noted: 2025-04-16

## 2025-04-16 PROCEDURE — ? LIQUID NITROGEN

## 2025-04-16 PROCEDURE — 17000 DESTRUCT PREMALG LESION: CPT | Mod: 59

## 2025-04-16 PROCEDURE — ? BIOPSY BY SHAVE METHOD

## 2025-04-16 PROCEDURE — ? COUNSELING

## 2025-04-16 PROCEDURE — 17003 DESTRUCT PREMALG LES 2-14: CPT

## 2025-04-16 PROCEDURE — 11102 TANGNTL BX SKIN SINGLE LES: CPT

## 2025-04-16 ASSESSMENT — LOCATION SIMPLE DESCRIPTION DERM
LOCATION SIMPLE: RIGHT ZYGOMA
LOCATION SIMPLE: LEFT FOREHEAD
LOCATION SIMPLE: LEFT FOREARM
LOCATION SIMPLE: LEFT CHEEK
LOCATION SIMPLE: SCALP
LOCATION SIMPLE: NOSE

## 2025-04-16 ASSESSMENT — LOCATION DETAILED DESCRIPTION DERM
LOCATION DETAILED: LEFT SUPERIOR PARIETAL SCALP
LOCATION DETAILED: LEFT NASAL DORSUM
LOCATION DETAILED: LEFT SUPERIOR FOREHEAD
LOCATION DETAILED: LEFT FOREHEAD
LOCATION DETAILED: LEFT SUPERIOR LATERAL MALAR CHEEK
LOCATION DETAILED: LEFT DISTAL DORSAL FOREARM
LOCATION DETAILED: RIGHT LATERAL ZYGOMA

## 2025-04-16 ASSESSMENT — LOCATION ZONE DERM
LOCATION ZONE: FACE
LOCATION ZONE: NOSE
LOCATION ZONE: ARM
LOCATION ZONE: SCALP

## 2025-04-16 NOTE — PROCEDURE: BIOPSY BY SHAVE METHOD
Detail Level: Detailed
Depth Of Biopsy: dermis
Was A Bandage Applied: Yes
Size Of Lesion In Cm: 1
X Size Of Lesion In Cm: 0
Biopsy Type: H and E
Biopsy Method: 15 blade- incisional biopsy technique
Anesthesia Type: 1% lidocaine with epinephrine
Anesthesia Volume In Cc: 0.5
Hemostasis: Electrocautery and TCA 35%
Wound Care: Petrolatum
Dressing: Band-Aid
Destruction After The Procedure: No
Type Of Destruction Used: Curettage
Curettage Text: The wound bed was treated with curettage after the biopsy was performed.
Cryotherapy Text: The wound bed was treated with cryotherapy after the biopsy was performed.
Electrodesiccation Text: The wound bed was treated with electrodesiccation after the biopsy was performed.
Electrodesiccation And Curettage Text: The wound bed was treated with electrodesiccation and curettage after the biopsy was performed.
Silver Nitrate Text: The wound bed was treated with silver nitrate after the biopsy was performed.
Lab: [id number]
Medical Necessity Information: It is in your best interest to select a reason for this procedure from the list below. All of these items fulfill various CMS LCD requirements except the new and changing color options.
Consent: Written consent was obtained and risks were reviewed including but not limited to scarring, infection, bleeding, scabbing, incomplete removal, nerve damage and allergy to anesthesia.
Post-Care Instructions: I reviewed with the patient in detail post-care instructions. Patient is to keep the biopsy site dry overnight, and then apply bacitracin twice daily until healed. Patient may apply hydrogen peroxide soaks to remove any crusting.
Notification Instructions: Patient will be notified of biopsy results. However, patient instructed to call the office if not contacted within 2 weeks.
Billing Type: Third-Party Bill
Information: Selecting Yes will display possible errors in your note based on the variables you have selected. This validation is only offered as a suggestion for you. PLEASE NOTE THAT THE VALIDATION TEXT WILL BE REMOVED WHEN YOU FINALIZE YOUR NOTE. IF YOU WANT TO FAX A PRELIMINARY NOTE YOU WILL NEED TO TOGGLE THIS TO 'NO' IF YOU DO NOT WANT IT IN YOUR FAXED NOTE.

## 2025-04-16 NOTE — PROCEDURE: LIQUID NITROGEN
Number Of Freeze-Thaw Cycles: 1 freeze-thaw cycle
Render Post-Care Instructions In Note?: no
Post-Care Instructions: I reviewed with the patient in detail post-care instructions. Patient is to wear sunprotection, and avoid picking at any of the treated lesions. Pt may apply Vaseline to crusted or scabbing areas.
Duration Of Freeze Thaw-Cycle (Seconds): 10
Application Tool (Optional): Cry-AC
Show Aperture Variable?: Yes
Detail Level: Detailed
Consent: The patient's consent was obtained including but not limited to risks of crusting, scabbing, blistering, scarring, darker or lighter pigmentary change, recurrence, incomplete removal and infection.

## 2025-05-12 ENCOUNTER — APPOINTMENT (OUTPATIENT)
Age: 89
Setting detail: DERMATOLOGY
End: 2025-05-12

## 2025-05-12 PROBLEM — C44.629 SQUAMOUS CELL CARCINOMA OF SKIN OF LEFT UPPER LIMB, INCLUDING SHOULDER: Status: ACTIVE | Noted: 2025-05-12

## 2025-05-12 PROCEDURE — ? CURETTAGE AND DESTRUCTION

## 2025-05-12 PROCEDURE — ? PRESCRIPTION

## 2025-05-12 PROCEDURE — ? PRESCRIPTION MEDICATION MANAGEMENT

## 2025-05-12 PROCEDURE — 17263 DSTRJ MAL LES T/A/L 2.1-3.0: CPT

## 2025-05-12 RX ORDER — MUPIROCIN 20 MG/G
OINTMENT TOPICAL
Qty: 15 | Refills: 0 | Status: ERX | COMMUNITY
Start: 2025-05-12

## 2025-05-12 RX ADMIN — MUPIROCIN: 20 OINTMENT TOPICAL at 00:00

## 2025-05-12 NOTE — PROCEDURE: CURETTAGE AND DESTRUCTION

## 2025-05-12 NOTE — PROCEDURE: PRESCRIPTION MEDICATION MANAGEMENT
Initiate Treatment: mupirocin 2 % topical ointment apply 1-2 x a day to AA on left forearm
Detail Level: Zone
Render In Strict Bullet Format?: No

## 2025-07-09 ENCOUNTER — APPOINTMENT (OUTPATIENT)
Age: 89
Setting detail: DERMATOLOGY
End: 2025-07-09

## 2025-07-09 DIAGNOSIS — L57.0 ACTINIC KERATOSIS: ICD-10-CM

## 2025-07-09 PROCEDURE — ? LIQUID NITROGEN

## 2025-07-09 PROCEDURE — ? COUNSELING

## 2025-07-09 ASSESSMENT — LOCATION DETAILED DESCRIPTION DERM
LOCATION DETAILED: LEFT CENTRAL FRONTAL SCALP
LOCATION DETAILED: LEFT DISTAL DORSAL FOREARM

## 2025-07-09 ASSESSMENT — LOCATION ZONE DERM
LOCATION ZONE: ARM
LOCATION ZONE: SCALP

## 2025-07-09 ASSESSMENT — LOCATION SIMPLE DESCRIPTION DERM
LOCATION SIMPLE: LEFT SCALP
LOCATION SIMPLE: LEFT FOREARM